# Patient Record
(demographics unavailable — no encounter records)

---

## 2025-04-25 NOTE — HISTORY OF PRESENT ILLNESS
[de-identified] : Patrice Wu is a 32 year old male who presents for evaluation of intermittent left pulsatile tinnitus. He states that for the past month he has had intermittent left pulsatile tinnitus. He states he had previous left aural fullness that resolved the month before. He denies hearing loss or vertigo. He denies otalgia or otorrhea. He denies recurrent ear infections or fever. He denies family history of hearing loss. He denies significant loud noise exposure or ototoxic drug exposure. He denies preceding head trauma.

## 2025-04-25 NOTE — ASSESSMENT
[FreeTextEntry1] : Patrice Wu presents for evaluation of intermittent left pulsatile tinnitus. Otoscopic exam wnl. Audiogram was performed today and revealed Type A tymps AD, Type As AS, hearing -8000 hz AU. Discussed that his pulsatile tinnitus may be due to eustachian tube dysfunction. Will start nasal steroid spray. If it persists, can consider imaging.  - start fluticasone 2 sprays BID x 3 weeks. - f/u in 1 mo

## 2025-05-23 NOTE — HISTORY OF PRESENT ILLNESS
[de-identified] :  Patrice Wu is a 32 year old male who presents for evaluation of intermittent left pulsatile tinnitus. He states that for the past month he has had intermittent left pulsatile tinnitus. He states he had previous left aural fullness that resolved the month before. He denies hearing loss or vertigo. He denies otalgia or otorrhea. He denies recurrent ear infections or fever. He denies family history of hearing loss. He denies significant loud noise exposure or ototoxic drug exposure. He denies preceding head trauma. [FreeTextEntry1] : 5/23/25 - Mr Oakley presents for follow-up. He used fluticasone spray. He notes some improvement in left pulsatile tinnitus. Notes that this is every other day, lasting for a few minutes. Denies hearing loss, otalgia, otorrhea, vertigo. No fevers.

## 2025-05-23 NOTE — ASSESSMENT
[FreeTextEntry1] : Patrice Wu presents for evaluation of intermittent left pulsatile tinnitus. Otoscopic exam wnl. Audiogram was performed at last visit and revealed Type A tymps AD, Type As AS, hearing -8000 hz AU. He has used fluticasone for eustachian tube dysfunction and notes improvement in pulsatile tinnitus. IT is less frequent now and of less duration. Given improvement, will observe. If it worsens, can order US duplex carotid and MRA/MRV head.  - continue fluticasone 2 sprays BID x 1 mo - f/u if symptoms persist or worsen.